# Patient Record
Sex: MALE | Race: AMERICAN INDIAN OR ALASKA NATIVE
[De-identification: names, ages, dates, MRNs, and addresses within clinical notes are randomized per-mention and may not be internally consistent; named-entity substitution may affect disease eponyms.]

---

## 2018-09-13 ENCOUNTER — HOSPITAL ENCOUNTER (EMERGENCY)
Dept: HOSPITAL 31 - C.ER | Age: 53
Discharge: HOME | End: 2018-09-13
Payer: COMMERCIAL

## 2018-09-13 VITALS — RESPIRATION RATE: 18 BRPM

## 2018-09-13 VITALS
SYSTOLIC BLOOD PRESSURE: 176 MMHG | DIASTOLIC BLOOD PRESSURE: 85 MMHG | TEMPERATURE: 98.6 F | HEART RATE: 64 BPM | OXYGEN SATURATION: 99 %

## 2018-09-13 DIAGNOSIS — Z23: ICD-10-CM
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
## 2018-09-13 DIAGNOSIS — S01.01XA: Primary | ICD-10-CM
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
## 2018-09-19 NOTE — C.PDOC
History Of Present Illness


53-year-old male presents to the ED for evaluation after he fell in the shower 

this morning. Patient states he is unsure how he fell and reports loss of 

consciousness. He denies any other complaints at this time. 


Time Seen by Provider: 09/13/18 14:14


Chief Complaint (Nursing): Abnormal Skin Integrity


History Per: Patient


History/Exam Limitations: no limitations


Onset/Duration Of Symptoms: Hrs


Current Symptoms Are (Timing): Still Present


Additional History Per: Patient





Past Medical History


Reviewed: Historical Data, Nursing Documentation, Vital Signs


Vital Signs: 


 Last Vital Signs











Temp  98.6 F   09/13/18 15:31


 


Pulse  64   09/13/18 15:31


 


Resp  18   09/13/18 15:31


 


BP  176/85 H  09/13/18 15:31


 


Pulse Ox  99   09/19/18 19:48














- Medical History


PMH: No Chronic Diseases


Surgical History: No Surg Hx


Family History: States: Unknown Family Hx





- Social History


Hx Alcohol Use: Yes


Hx Substance Use: No





- Immunization History


Hx Tetanus Toxoid Vaccination: No


Hx Influenza Vaccination: No


Hx Pneumococcal Vaccination: No





Review Of Systems


Neurological: Positive for: Other (loss of consciousess following fall )





Physical Exam





- Physical Exam


Appears: Non-toxic, No Acute Distress


Skin: Normal Color, Warm, Dry


Head: Laceration (3cm, to occiput. no active bleeding )


Eye(s): bilateral: Normal Inspection


Ear(s): Bilateral: Normal


Nose: No Deformity, No Septal Hematoma


Oral Mucosa: Moist


Neck: No Midline Cervical Tenderness, No Paracervical Tenderness, Supple


Chest: Symmetrical, No Deformity, No Tenderness


Cardiovascular: Rhythm Regular, No Murmur


Respiratory: Normal Breath Sounds, No Rales, No Rhonchi, No Wheezing


Gastrointestinal/Abdominal: Soft, No Tenderness, No Guarding, No Rebound


Extremity: Normal ROM, Capillary Refill (less than 2 seconds )


Neurological/Psych: Oriented x3, Normal Speech, Normal Cognition





ED Course And Treatment


O2 Sat by Pulse Oximetry: 99 (on RA)


Pulse Ox Interpretation: Normal





Laceration





- Laceration Repair


  ** occiput 


Wound Length (In cm): 3


Description Of Wound: Linear


Anesthesia: Lidocaine 2%, With Epi


Wound Examination: Irrigated With Saline, No FB With Wound Exploration, No 

Tendon Injury With Wound Exploration


Wound Closure: Staples (five )





Medical Decision Making


Medical Decision Making: 





Progress: 


Tetanus IM administered. 


Patient refuses a head CT at this time. 





3cm laceration to occiput. Local anesthesia achieved with 2% lidocaine with 

epinephrine. Wound irrigated with NS and explored. No FB seen. Area cleansed 

with 10% betadine. Five staples placed. Pt tolerated well with minimal bleeding.





This patient is choosing to leave against medical advice.  I have personally 

explained to the patient that choosing to do so may result in permanent bodily 

harm or death.  I have discussed at great length that without further 

evaluation and monitoring there may be unforeseen circumstances and/or 

deterioration causing permanent bodily harm or death as a result of their 

choice.  The patient is alert, oriented, and shows the mental capacity to make 

clear decisions regarding the patients health care at this time. The patient 

continues to wish to leave against medical advice.  


In light of the patients decision to leave AMA, follow-up has been arranged and 

the patient is aware of the importance of following up as instructed.  The 

patient has been advised that they should return to the ED immediately if they 

change their mind at any time, or if their condition begins to change or worsen 

in any way.








Disposition





- Disposition


Disposition: HOME/ ROUTINE


Disposition Time: 04:00


Condition: STABLE


Additional Instructions: 


you are declining any imaging and lab work. return to any er with worsneing 

symptoms or concerns. return to your doctor/clinic or er in 7 days for removal. 


Instructions:  Concussion, Adult (DC), Laceration Repair With Gómez (DC), 

Postconcussion Syndrome (DC), Head Injury Observation (DC), Leaving Against 

Medical Advice


Forms:  [facility] (English)





- Clinical Impression


Clinical Impression: 


 Scalp laceration








- Scribe Statement


The provider has reviewed the documentation as recorded by the Scribe (Yael Hernandez)


Provider Attestation: 








All medical record entries made by the Scribe were at my direction and 

personally dictated by me. I have reviewed the chart and agree that the record 

accurately reflects my personal performance of the history, physical exam, 

medical decision making, and the department course for this patient. I have 

also personally directed, reviewed, and agree with the discharge instructions 

and disposition.